# Patient Record
Sex: MALE | Race: BLACK OR AFRICAN AMERICAN | NOT HISPANIC OR LATINO | URBAN - METROPOLITAN AREA
[De-identification: names, ages, dates, MRNs, and addresses within clinical notes are randomized per-mention and may not be internally consistent; named-entity substitution may affect disease eponyms.]

---
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
## 2022-12-19 NOTE — ED ADULT TRIAGE NOTE - MEANS OF ARRIVAL
ambulatory pain localized to epigastric region, likely 2/2 viral illness; abd sono wnl, CBC mildly elevated with neutrophil predominance    zofran prn nausea/vomiting  famotidine for GI ppx  tylenol/motrin prn pain